# Patient Record
Sex: MALE | Race: BLACK OR AFRICAN AMERICAN | NOT HISPANIC OR LATINO | URBAN - METROPOLITAN AREA
[De-identification: names, ages, dates, MRNs, and addresses within clinical notes are randomized per-mention and may not be internally consistent; named-entity substitution may affect disease eponyms.]

---

## 2021-08-16 ENCOUNTER — EMERGENCY (EMERGENCY)
Facility: HOSPITAL | Age: 36
LOS: 1 days | Discharge: HOME | End: 2021-08-16
Attending: EMERGENCY MEDICINE | Admitting: EMERGENCY MEDICINE
Payer: SELF-PAY

## 2021-08-16 VITALS
SYSTOLIC BLOOD PRESSURE: 123 MMHG | OXYGEN SATURATION: 96 % | TEMPERATURE: 98 F | HEART RATE: 95 BPM | RESPIRATION RATE: 18 BRPM | WEIGHT: 169.98 LBS | HEIGHT: 69 IN | DIASTOLIC BLOOD PRESSURE: 72 MMHG
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
## 2021-08-16 DIAGNOSIS — S61.412A LACERATION WITHOUT FOREIGN BODY OF LEFT HAND, INITIAL ENCOUNTER: ICD-10-CM

## 2021-08-16 DIAGNOSIS — Z23 ENCOUNTER FOR IMMUNIZATION: ICD-10-CM
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
## 2021-08-16 PROCEDURE — 99284 EMERGENCY DEPT VISIT MOD MDM: CPT

## 2021-08-16 PROCEDURE — 73130 X-RAY EXAM OF HAND: CPT | Mod: 26,LT

## 2021-08-16 RX ORDER — MELOXICAM 15 MG/1
1 TABLET ORAL
Qty: 10 | Refills: 0
Start: 2021-08-16 | End: 2021-08-20

## 2021-08-16 RX ORDER — TETANUS TOXOID, REDUCED DIPHTHERIA TOXOID AND ACELLULAR PERTUSSIS VACCINE, ADSORBED 5; 2.5; 8; 8; 2.5 [IU]/.5ML; [IU]/.5ML; UG/.5ML; UG/.5ML; UG/.5ML
0.5 SUSPENSION INTRAMUSCULAR ONCE
Refills: 0 | Status: COMPLETED | OUTPATIENT
Start: 2021-08-16 | End: 2021-08-16

## 2021-08-16 RX ORDER — BNT162B2 0.23 MG/2.25ML
0.3 INJECTION, SUSPENSION INTRAMUSCULAR ONCE
Refills: 0 | Status: COMPLETED | OUTPATIENT
Start: 2021-08-16 | End: 2021-08-16

## 2021-08-16 RX ADMIN — BNT162B2 0.3 MILLILITER(S): 0.23 INJECTION, SUSPENSION INTRAMUSCULAR at 19:51

## 2021-08-16 RX ADMIN — TETANUS TOXOID, REDUCED DIPHTHERIA TOXOID AND ACELLULAR PERTUSSIS VACCINE, ADSORBED 0.5 MILLILITER(S): 5; 2.5; 8; 8; 2.5 SUSPENSION INTRAMUSCULAR at 19:53

## 2021-08-16 RX ADMIN — Medication 100 MILLIGRAM(S): at 19:42

## 2021-08-16 RX ADMIN — Medication 500 MILLIGRAM(S): at 19:42

## 2021-08-16 RX ADMIN — Medication 500 MILLIGRAM(S): at 20:22

## 2021-08-16 NOTE — ED PROVIDER NOTE - CARE PROVIDER_API CALL
Anastasia Croft)  Plastic Surgery; Surgery  [address] [place] [id number]  Phone: [phone]  Fax: [phone]  Follow Up Time:

## 2021-08-16 NOTE — ED PROVIDER NOTE - NSFOLLOWUPINSTRUCTIONS_ED_ALL_ED_FT
keep dressing on for 48 hours then soak off in warm, soapy water and leave open to air.     doxycycline as prescribed.    Follow-up with hand surgeon listed.
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
## 2021-08-16 NOTE — ED PROVIDER NOTE - PHYSICAL EXAMINATION
1 cm laceration base of left 3rd/4th fingers, palmar surface, no erythema/warmth, + yellow exudate. FROM MCP/DIP/PIP. Sensation intact distal fingertips.

## 2021-08-16 NOTE — ED PROVIDER NOTE - CLINICAL SUMMARY MEDICAL DECISION MAKING FREE TEXT BOX
wounds too old to repair surgically; will clean areas, dress, antibiotics. Pt also requesting covid vaccine; pharmacist consulted.
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
## 2021-08-16 NOTE — ED PROVIDER NOTE - PROGRESS NOTE DETAILS
wounds cleansed/irrigated, expolred, No tendon visible, FROM Flexion. Wounds dressed with xeroform/gauze/coban.

## 2021-08-16 NOTE — ED PROVIDER NOTE - PATIENT PORTAL LINK FT
You can access the FollowMyHealth Patient Portal offered by [facility] by registering at the following website: [email or web address]. By joining [facility]’s FollowMyHealth portal, you will also be able to view your health information using other applications (apps) compatible with our system.